# Patient Record
Sex: FEMALE | Race: OTHER | Employment: FULL TIME | URBAN - METROPOLITAN AREA
[De-identification: names, ages, dates, MRNs, and addresses within clinical notes are randomized per-mention and may not be internally consistent; named-entity substitution may affect disease eponyms.]

---

## 2024-03-05 PROBLEM — Z78.9 NONSMOKER: Status: ACTIVE | Noted: 2024-03-05

## 2024-03-05 NOTE — PROGRESS NOTES
[facility]  [address] [place] [address]  Phone: [phone]  Fax: [phone]      Date of Visit:  3/6/2024  Patient Name: Fransisca Momin   Patient :  1970     HPI:     Fransisca Momin is a 53 y.o. female who presents today to discuss   Chief Complaint   Patient presents with    New Patient     Get established. Have not seen a PCP in over 3 years, was with a doc at [facility].     Headache     Not sure if related to insomnia or elevated BP.  Home BP, systolic 130s and can not remember what the diastolic was.  Denies any chest pains or pressure.  Unsure family history HTN or cardiac issues.      This is a 53 year old female with no significant PMHx who presents today to establish as a new patient. She was previously seen by IHSAN Rojo at [facility].     Patient is concerned about her blood pressure today. She had one episode of dizziness that occurred one month ago. No other associated symptoms of near syncope, headache, vision changes, weakness, chest pain, or shortness of breath. Her  told her to check her blood pressure, which showed SBP 190s and DBP 80-90s. Blood pressure eventually came down. She has since been sporadically checking. Two days ago, SBP 160s and patient was asymptomatic. Her BP cuff is relatively new as it purchased about 2 months ago.    Patient is also complaining of intermittent headaches and chronic poor sleep. Headaches occur once a month and sometimes before her period. Episodes last a few seconds and resolves spontaneously. Patient unable to pinpoint a specific region and gestured to around the whole head. She denies any associated vision changes, auras, lacrimation, or sinus issues. She does not take any OTC meds for relief. In regards to sleep, she reports she has always had poor sleep. She denies any issues falling asleep and maintains an 8:30 pm - 5:30 am sleep

## 2024-03-06 ENCOUNTER — OFFICE VISIT (OUTPATIENT)
Age: 54
End: 2024-03-06
Payer: COMMERCIAL

## 2024-03-06 VITALS
HEIGHT: 59 IN | DIASTOLIC BLOOD PRESSURE: 70 MMHG | SYSTOLIC BLOOD PRESSURE: 133 MMHG | HEART RATE: 76 BPM | TEMPERATURE: 97.8 F | BODY MASS INDEX: 23.2 KG/M2 | WEIGHT: 115.1 LBS

## 2024-03-06 DIAGNOSIS — R05.8 PRODUCTIVE COUGH: ICD-10-CM

## 2024-03-06 DIAGNOSIS — R51.9 NONINTRACTABLE EPISODIC HEADACHE, UNSPECIFIED HEADACHE TYPE: ICD-10-CM

## 2024-03-06 DIAGNOSIS — R03.0 ELEVATED BP WITHOUT DIAGNOSIS OF HYPERTENSION: Primary | ICD-10-CM

## 2024-03-06 DIAGNOSIS — G47.9 SLEEP DIFFICULTIES: ICD-10-CM

## 2024-03-06 DIAGNOSIS — Z78.9 NONSMOKER: ICD-10-CM

## 2024-03-06 PROCEDURE — 99203 OFFICE O/P NEW LOW 30 MIN: CPT

## 2024-03-06 PROCEDURE — 99212 OFFICE O/P EST SF 10 MIN: CPT

## 2024-03-06 SDOH — ECONOMIC STABILITY: HOUSING INSECURITY
IN THE LAST 12 MONTHS, WAS THERE A TIME WHEN YOU DID NOT HAVE A STEADY PLACE TO SLEEP OR SLEPT IN A SHELTER (INCLUDING NOW)?: NO

## 2024-03-06 SDOH — ECONOMIC STABILITY: FOOD INSECURITY: WITHIN THE PAST 12 MONTHS, YOU WORRIED THAT YOUR FOOD WOULD RUN OUT BEFORE YOU GOT MONEY TO BUY MORE.: NEVER TRUE

## 2024-03-06 SDOH — ECONOMIC STABILITY: FOOD INSECURITY: WITHIN THE PAST 12 MONTHS, THE FOOD YOU BOUGHT JUST DIDN'T LAST AND YOU DIDN'T HAVE MONEY TO GET MORE.: NEVER TRUE

## 2024-03-06 SDOH — ECONOMIC STABILITY: INCOME INSECURITY: HOW HARD IS IT FOR YOU TO PAY FOR THE VERY BASICS LIKE FOOD, HOUSING, MEDICAL CARE, AND HEATING?: NOT HARD AT ALL

## 2024-03-06 ASSESSMENT — PATIENT HEALTH QUESTIONNAIRE - PHQ9
SUM OF ALL RESPONSES TO PHQ9 QUESTIONS 1 & 2: 0
SUM OF ALL RESPONSES TO PHQ QUESTIONS 1-9: 0
SUM OF ALL RESPONSES TO PHQ QUESTIONS 1-9: 0
1. LITTLE INTEREST OR PLEASURE IN DOING THINGS: 0
2. FEELING DOWN, DEPRESSED OR HOPELESS: 0
SUM OF ALL RESPONSES TO PHQ QUESTIONS 1-9: 0
SUM OF ALL RESPONSES TO PHQ QUESTIONS 1-9: 0

## 2024-03-06 NOTE — PATIENT INSTRUCTIONS
Thank you for following up with us at [facility] outpatient residency clinic! It was a pleasure to meet you today!     Our plan is the following:  - Please get your labs done before the next visit. We will review the results together.  - Fill and bring your blood pressure log to the next visit. Please also bring your blood pressure cuff/machine.  - For sleep, you may take magnesium glycinate. [employer] sells a brand called \"Calm\" which you can try. Refer to the handout attached for more info on sleep.   - For your cough, you can try over the counter cough drops, dextromethorphan (Delsym or Robitussin), or a spoonful of honey. This is most likely viral and will resolve on its own, but if you develop fevers, chills, chest pain, shortness of breath, please call and schedule an appointment.    If you have any additional questions or concerns, please call the office ([phone]) and speak to one of the staff. They will triage and forward the message to the doctors! Have a great rest of your day!

## 2024-03-22 ENCOUNTER — HOSPITAL ENCOUNTER (OUTPATIENT)
Age: 54
Setting detail: SPECIMEN
Discharge: HOME | End: 2024-03-22

## 2024-03-22 DIAGNOSIS — R03.0 ELEVATED BP WITHOUT DIAGNOSIS OF HYPERTENSION: ICD-10-CM

## 2024-03-22 LAB
ALBUMIN SERPL-MCNC: 4.4 G/DL (ref 3.5–5.2)
ALBUMIN/GLOB SERPL: 1 {RATIO} (ref 1–2.5)
ALP SERPL-CCNC: 73 U/L (ref 35–104)
ALT SERPL-CCNC: 39 U/L (ref 10–35)
ANION GAP SERPL CALCULATED.3IONS-SCNC: 10 MMOL/L (ref 9–16)
AST SERPL-CCNC: 28 U/L (ref 10–35)
BASOPHILS # BLD: 0.06 K/UL (ref 0–0.2)
BASOPHILS NFR BLD: 1 % (ref 0–2)
BILIRUB SERPL-MCNC: 0.6 MG/DL (ref 0–1.2)
BUN SERPL-MCNC: 19 MG/DL (ref 6–20)
CALCIUM SERPL-MCNC: 9.5 MG/DL (ref 8.6–10.4)
CHLORIDE SERPL-SCNC: 100 MMOL/L (ref 98–107)
CHOLEST SERPL-MCNC: 306 MG/DL (ref 0–199)
CHOLESTEROL/HDL RATIO: 5
CO2 SERPL-SCNC: 26 MMOL/L (ref 20–31)
CREAT SERPL-MCNC: 0.6 MG/DL (ref 0.5–0.9)
EOSINOPHIL # BLD: 0.12 K/UL (ref 0–0.44)
EOSINOPHILS RELATIVE PERCENT: 2 % (ref 1–4)
ERYTHROCYTE [DISTWIDTH] IN BLOOD BY AUTOMATED COUNT: 12.7 % (ref 11.8–14.4)
GFR SERPL CREATININE-BSD FRML MDRD: >60 ML/MIN/1.73M2
GLUCOSE SERPL-MCNC: 99 MG/DL (ref 74–99)
HCT VFR BLD AUTO: 39.7 % (ref 36.3–47.1)
HDLC SERPL-MCNC: 63 MG/DL
HGB BLD-MCNC: 13.1 G/DL (ref 11.9–15.1)
IMM GRANULOCYTES # BLD AUTO: <0.03 K/UL (ref 0–0.3)
IMM GRANULOCYTES NFR BLD: 0 %
LDLC SERPL CALC-MCNC: 223 MG/DL (ref 0–100)
LYMPHOCYTES NFR BLD: 2.28 K/UL (ref 1.1–3.7)
LYMPHOCYTES RELATIVE PERCENT: 44 % (ref 24–43)
MCH RBC QN AUTO: 29.3 PG (ref 25.2–33.5)
MCHC RBC AUTO-ENTMCNC: 33 G/DL (ref 28.4–34.8)
MCV RBC AUTO: 88.8 FL (ref 82.6–102.9)
MONOCYTES NFR BLD: 0.34 K/UL (ref 0.1–1.2)
MONOCYTES NFR BLD: 7 % (ref 3–12)
NEUTROPHILS NFR BLD: 46 % (ref 36–65)
NEUTS SEG NFR BLD: 2.42 K/UL (ref 1.5–8.1)
NRBC BLD-RTO: 0 PER 100 WBC
PLATELET # BLD AUTO: 289 K/UL (ref 138–453)
PMV BLD AUTO: 10.1 FL (ref 8.1–13.5)
POTASSIUM SERPL-SCNC: 3.8 MMOL/L (ref 3.7–5.3)
PROT SERPL-MCNC: 7.9 G/DL (ref 6.6–8.7)
RBC # BLD AUTO: 4.47 M/UL (ref 3.95–5.11)
SODIUM SERPL-SCNC: 136 MMOL/L (ref 136–145)
TRIGL SERPL-MCNC: 97 MG/DL
TSH SERPL DL<=0.05 MIU/L-ACNC: 1.04 UIU/ML (ref 0.27–4.2)
VLDLC SERPL CALC-MCNC: 19 MG/DL
WBC OTHER # BLD: 5.2 K/UL (ref 3.5–11.3)

## 2024-03-29 ENCOUNTER — HOSPITAL ENCOUNTER (OUTPATIENT)
Age: 54
Setting detail: SPECIMEN
Discharge: HOME | End: 2024-03-29

## 2024-03-29 ENCOUNTER — OFFICE VISIT (OUTPATIENT)
Age: 54
End: 2024-03-29
Payer: COMMERCIAL

## 2024-03-29 VITALS
DIASTOLIC BLOOD PRESSURE: 65 MMHG | RESPIRATION RATE: 18 BRPM | WEIGHT: 115.5 LBS | SYSTOLIC BLOOD PRESSURE: 130 MMHG | BODY MASS INDEX: 23.33 KG/M2 | HEART RATE: 72 BPM | TEMPERATURE: 97.7 F

## 2024-03-29 DIAGNOSIS — Z23 NEED FOR TDAP VACCINATION: ICD-10-CM

## 2024-03-29 DIAGNOSIS — E78.49 OTHER HYPERLIPIDEMIA: ICD-10-CM

## 2024-03-29 DIAGNOSIS — Z01.419 ENCOUNTER FOR WELL WOMAN EXAM WITH ROUTINE GYNECOLOGICAL EXAM: Primary | ICD-10-CM

## 2024-03-29 DIAGNOSIS — Z12.4 CERVICAL CANCER SCREENING: ICD-10-CM

## 2024-03-29 DIAGNOSIS — Z11.3 ROUTINE SCREENING FOR STI (SEXUALLY TRANSMITTED INFECTION): ICD-10-CM

## 2024-03-29 DIAGNOSIS — Z12.31 BREAST CANCER SCREENING BY MAMMOGRAM: ICD-10-CM

## 2024-03-29 DIAGNOSIS — Z12.11 COLON CANCER SCREENING: ICD-10-CM

## 2024-03-29 LAB
C TRACH DNA SPEC QL PROBE+SIG AMP: NORMAL
N GONORRHOEA DNA SPEC QL PROBE+SIG AMP: NORMAL
SPECIMEN DESCRIPTION: NORMAL

## 2024-03-29 PROCEDURE — 99212 OFFICE O/P EST SF 10 MIN: CPT

## 2024-03-29 PROCEDURE — 90715 TDAP VACCINE 7 YRS/> IM: CPT | Performed by: FAMILY MEDICINE

## 2024-03-29 RX ORDER — ATORVASTATIN CALCIUM 40 MG/1
40 TABLET, FILM COATED ORAL DAILY
Qty: 90 TABLET | Refills: 0 | Status: SHIPPED | OUTPATIENT
Start: 2024-03-29

## 2024-03-29 ASSESSMENT — ENCOUNTER SYMPTOMS
DIARRHEA: 0
SHORTNESS OF BREATH: 0
ABDOMINAL PAIN: 0
NAUSEA: 0

## 2024-03-29 NOTE — PROGRESS NOTES
mammogram  -     [facility] DIGITAL SCREEN W OR WO CAD BILATERAL; Future  3. Cervical cancer screening  -     PAP SMEAR  4. Colon cancer screening  -     [facility] - Anuel Serna MD, Colorectal Surgery, [place]  5. Other hyperlipidemia  -     atorvastatin (LIPITOR) 40 MG tablet; Take 1 tablet by mouth daily, Disp-90 tablet, R-0Normal  -     Lipid Panel; Future  6. Routine screening for STI (sexually transmitted infection)  -     HIV Screen; Future  -     T. Pallidum Ab; Future  -     Chlamydia/GC DNA, Thin Prep; Future  -     Hepatitis C Antibody; Future  -     Hepatitis B Surface Antibody; Future  7. Need for Tdap vaccination  -     Tdap, BOOSTRIX, (age 10 yrs+), IM     COMMUNICATION:     - Hereditary Breast, Ovarian, Colon and Uterine Cancer screening Done.          - Tobacco & Secondary smoke risks reviewed; instructed on cessation and avoidance  - Counseling Completed: Refer to plan  - Follow up Pap as per American Society for Colposcopy and Cervical Pathology guidelines.   - Mammograms every 1 year. If 39 yo and last mammogram was negative.  - Calcium and Vitamin D dosing reviewed.  - Colonoscopy screening reviewed as well as onset for bone density testing.  - Birth control and barrier recommendations discussed.  - STD counseling and prevention reviewed.  - Routine health maintenance per patients PCP.  - Medication directions and side effects discussed  - Medications, laboratory testing, imaging, consultation, and follow up as documented in this encounter.     - Return in about 3 months (around 6/29/2024) for HLD.    Please be aware portions of this note were completed using voice recognition software and unforeseen errors may have occurred    Patient was seen and discussed with preceptor  Dr. Moeller  at the time of the encounter.       Electronically signed by Stacey Solo MD on 3/29/2024 at 11:49 AM

## 2024-03-29 NOTE — PROGRESS NOTES
[facility]  [address] [place] [address]  Phone: [phone]  Fax: [phone]      Date of Visit:  2024  Patient Name: Fransisca Momin   Patient :  1970     HPI:     Fransisca Momin is a 53 y.o. female who presents today to discuss   Chief Complaint   Patient presents with    Cerumen Impaction     Bilateral ear irrigation     This is a 53-year-old female with a history of hyperlipidemia who presents for cerumen impaction.    It was noted on the previous physical exam that patient had bilateral cerumen impaction.  She denies any ear drainage or pain but does endorse decreased hearing bilaterally.  Patient does use Q-tips at home.  No other acute symptoms.  Bilateral ear irrigation performed in office today.  Patient tolerated the procedure well; no complications.  Hearing improved status post disimpaction.    I personally reviewed the patient's past medical history, current medications, allergies, surgical history, family history and social history.  Updates were made as necessary.    REVIEW OF SYSTEM      Review of Systems   HENT:  Negative for ear discharge and ear pain.         Decreased hearing secondary to cerumen impaction   Musculoskeletal:  Negative for gait problem.   Neurological:  Negative for dizziness, weakness and headaches.   All other systems reviewed and are negative.      REVIEWED INFORMATION      No Known Allergies    Patient Active Problem List   Diagnosis    Nonsmoker    Other hyperlipidemia       Past Medical History:   Diagnosis Date    Visual impairment        Past Surgical History:   Procedure Laterality Date     SECTION          Social History     Socioeconomic History    Marital status:      Spouse name: Carlos    Highest education level: Some college, no degree   Occupational History    Occupation: [employer] Employee     Employer: [employer]   Tobacco Use    Smoking status: Never

## 2024-03-29 NOTE — PATIENT INSTRUCTIONS
Thank you for following up with us at [facility] outpatient residency clinic! It was a pleasure to meet you today!     Our plan is the following:  - Please get your mammogram and colonoscopy scheduled.  - I will contact you with the results of your pap when they are available.  - Please  your medications from the pharmacy. Please see the attached handout for more information about the medication  - Please complete your blood work today and again in three months.     Preventative Health Testing:  Date of Last Pap Smear: 10/7/2020, negative cytology; performed today  Date of Last Mammogram: 11/10/2020, normal; ordered today  Date of Last Colonoscopy: Never done; referral sent today  Date of Last Bone Density: N/A (Not due until 65)    Immunizations:  Pneumonia: N/A (Not due until 65)  Influenza: Never done (Recommend yearly between Aug-March)  COVID: 3/18/21, 4/8/21 (Recommend booster now)  Shingles: Never done (Recommend now)  RSV: Never done (Recommend now)  Tdap: 5/24/13 (Due now - given in office)    If you have any additional questions or concerns, please call the office ([phone]) and speak to one of the staff. They will triage and forward the message to the doctors! Have a great rest of your day!

## 2024-04-01 LAB
C TRACH DNA SPEC QL PROBE+SIG AMP: NEGATIVE
N GONORRHOEA DNA SPEC QL PROBE+SIG AMP: NEGATIVE
SPECIMEN DESCRIPTION: NORMAL

## 2024-04-02 LAB
HPV I/H RISK 4 DNA CVX QL NAA+PROBE: NOT DETECTED
HPV SAMPLE: NORMAL
HPV, INTERPRETATION: NORMAL
HPV16 DNA CVX QL NAA+PROBE: NOT DETECTED
HPV18 DNA CVX QL NAA+PROBE: NOT DETECTED
SPECIMEN DESCRIPTION: NORMAL

## 2024-04-05 ENCOUNTER — OFFICE VISIT (OUTPATIENT)
Age: 54
End: 2024-04-05
Payer: COMMERCIAL

## 2024-04-05 VITALS
DIASTOLIC BLOOD PRESSURE: 65 MMHG | BODY MASS INDEX: 23.53 KG/M2 | RESPIRATION RATE: 18 BRPM | HEART RATE: 71 BPM | WEIGHT: 116.5 LBS | TEMPERATURE: 98.5 F | SYSTOLIC BLOOD PRESSURE: 115 MMHG

## 2024-04-05 DIAGNOSIS — H61.23 BILATERAL IMPACTED CERUMEN: Primary | ICD-10-CM

## 2024-04-05 PROCEDURE — 99212 OFFICE O/P EST SF 10 MIN: CPT

## 2024-04-05 PROCEDURE — 69210 REMOVE IMPACTED EAR WAX UNI: CPT

## 2024-04-05 NOTE — PROGRESS NOTES
Procedure Documentation:  Procedure:     Ear Irrigation/instrumented removal of cerumen   Location:       bilateral Ear Canals  Reason:         Impacted Wax/Cerumen  Note:             Patients ear(s) was/were irrigated with warm tap water and peroxide using Waterpik. Manual removal with cerumen curette  was done by provider, Dr Solo.   I was able to remove the wax/cerumen completely.   Patient tolerated the procedure well.  Pt instructions: continue current home treatments.

## 2024-04-12 LAB — CYTOLOGY REPORT: NORMAL

## 2024-10-28 ENCOUNTER — HOSPITAL ENCOUNTER (OUTPATIENT)
Dept: MAMMOGRAPHY | Age: 54
Discharge: HOME | End: 2024-10-30
Payer: COMMERCIAL

## 2024-10-28 VITALS — WEIGHT: 115 LBS | BODY MASS INDEX: 23.18 KG/M2 | HEIGHT: 59 IN

## 2024-10-28 DIAGNOSIS — Z12.31 BREAST CANCER SCREENING BY MAMMOGRAM: ICD-10-CM

## 2024-10-28 PROCEDURE — 77063 BREAST TOMOSYNTHESIS BI: CPT
